# Patient Record
Sex: FEMALE | Race: WHITE | NOT HISPANIC OR LATINO | URBAN - METROPOLITAN AREA
[De-identification: names, ages, dates, MRNs, and addresses within clinical notes are randomized per-mention and may not be internally consistent; named-entity substitution may affect disease eponyms.]

---

## 2020-08-14 ENCOUNTER — EMERGENCY (EMERGENCY)
Facility: HOSPITAL | Age: 80
LOS: 1 days | Discharge: HOME | End: 2020-08-14
Attending: EMERGENCY MEDICINE | Admitting: EMERGENCY MEDICINE
Payer: MEDICARE

## 2020-08-14 VITALS
RESPIRATION RATE: 18 BRPM | HEIGHT: 65 IN | OXYGEN SATURATION: 98 % | HEART RATE: 100 BPM | TEMPERATURE: 98 F | WEIGHT: 138.01 LBS | DIASTOLIC BLOOD PRESSURE: 81 MMHG | SYSTOLIC BLOOD PRESSURE: 188 MMHG

## 2020-08-14 VITALS
SYSTOLIC BLOOD PRESSURE: 160 MMHG | HEART RATE: 90 BPM | OXYGEN SATURATION: 98 % | DIASTOLIC BLOOD PRESSURE: 92 MMHG | RESPIRATION RATE: 17 BRPM
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
## 2020-08-14 DIAGNOSIS — S09.90XA UNSPECIFIED INJURY OF HEAD, INITIAL ENCOUNTER: ICD-10-CM

## 2020-08-14 DIAGNOSIS — S01.81XA LACERATION WITHOUT FOREIGN BODY OF OTHER PART OF HEAD, INITIAL ENCOUNTER: ICD-10-CM

## 2020-08-14 DIAGNOSIS — R55 SYNCOPE AND COLLAPSE: ICD-10-CM

## 2020-08-14 DIAGNOSIS — Z98.49 CATARACT EXTRACTION STATUS, UNSPECIFIED EYE: Chronic | ICD-10-CM
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
## 2020-08-14 DIAGNOSIS — S70.311A ABRASION, RIGHT THIGH, INITIAL ENCOUNTER: ICD-10-CM
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
## 2020-08-14 LAB
ANION GAP SERPL CALC-SCNC: 15 MMOL/L — SIGNIFICANT CHANGE UP (ref 5–17)
BASOPHILS # BLD AUTO: 0.03 K/UL — SIGNIFICANT CHANGE UP (ref 0–0.2)
BASOPHILS NFR BLD AUTO: 0.3 % — SIGNIFICANT CHANGE UP (ref 0–2)
BUN SERPL-MCNC: 17 MG/DL — SIGNIFICANT CHANGE UP (ref 7–23)
CALCIUM SERPL-MCNC: 10 MG/DL — SIGNIFICANT CHANGE UP (ref 8.4–10.5)
CHLORIDE SERPL-SCNC: 95 MMOL/L — LOW (ref 96–108)
CO2 SERPL-SCNC: 26 MMOL/L — SIGNIFICANT CHANGE UP (ref 22–31)
CREAT SERPL-MCNC: 0.81 MG/DL — SIGNIFICANT CHANGE UP (ref 0.5–1.3)
EOSINOPHIL # BLD AUTO: 0.01 K/UL — SIGNIFICANT CHANGE UP (ref 0–0.5)
EOSINOPHIL NFR BLD AUTO: 0.1 % — SIGNIFICANT CHANGE UP (ref 0–6)
GLUCOSE SERPL-MCNC: 149 MG/DL — HIGH (ref 70–99)
HCT VFR BLD CALC: 40.8 % — SIGNIFICANT CHANGE UP (ref 34.5–45)
HGB BLD-MCNC: 14.2 G/DL — SIGNIFICANT CHANGE UP (ref 11.5–15.5)
IMM GRANULOCYTES NFR BLD AUTO: 0.5 % — SIGNIFICANT CHANGE UP (ref 0–1.5)
LYMPHOCYTES # BLD AUTO: 0.69 K/UL — LOW (ref 1–3.3)
LYMPHOCYTES # BLD AUTO: 6.9 % — LOW (ref 13–44)
MCHC RBC-ENTMCNC: 31 PG — SIGNIFICANT CHANGE UP (ref 27–34)
MCHC RBC-ENTMCNC: 34.8 GM/DL — SIGNIFICANT CHANGE UP (ref 32–36)
MCV RBC AUTO: 89.1 FL — SIGNIFICANT CHANGE UP (ref 80–100)
MONOCYTES # BLD AUTO: 0.42 K/UL — SIGNIFICANT CHANGE UP (ref 0–0.9)
MONOCYTES NFR BLD AUTO: 4.2 % — SIGNIFICANT CHANGE UP (ref 2–14)
NEUTROPHILS # BLD AUTO: 8.84 K/UL — HIGH (ref 1.8–7.4)
NEUTROPHILS NFR BLD AUTO: 88 % — HIGH (ref 43–77)
NRBC # BLD: 0 /100 WBCS — SIGNIFICANT CHANGE UP (ref 0–0)
PLATELET # BLD AUTO: 173 K/UL — SIGNIFICANT CHANGE UP (ref 150–400)
POTASSIUM SERPL-MCNC: 3 MMOL/L — LOW (ref 3.5–5.3)
POTASSIUM SERPL-SCNC: 3 MMOL/L — LOW (ref 3.5–5.3)
RBC # BLD: 4.58 M/UL — SIGNIFICANT CHANGE UP (ref 3.8–5.2)
RBC # FLD: 13.1 % — SIGNIFICANT CHANGE UP (ref 10.3–14.5)
SODIUM SERPL-SCNC: 136 MMOL/L — SIGNIFICANT CHANGE UP (ref 135–145)
TROPONIN T SERPL-MCNC: <0.01 NG/ML — SIGNIFICANT CHANGE UP (ref 0–0.01)
WBC # BLD: 10.04 K/UL — SIGNIFICANT CHANGE UP (ref 3.8–10.5)
WBC # FLD AUTO: 10.04 K/UL — SIGNIFICANT CHANGE UP (ref 3.8–10.5)

## 2020-08-14 PROCEDURE — 70450 CT HEAD/BRAIN W/O DYE: CPT | Mod: 26

## 2020-08-14 PROCEDURE — 85025 COMPLETE CBC W/AUTO DIFF WBC: CPT

## 2020-08-14 PROCEDURE — 96360 HYDRATION IV INFUSION INIT: CPT | Mod: XU

## 2020-08-14 PROCEDURE — 12031 INTMD RPR S/A/T/EXT 2.5 CM/<: CPT

## 2020-08-14 PROCEDURE — 93010 ELECTROCARDIOGRAM REPORT: CPT

## 2020-08-14 PROCEDURE — 96361 HYDRATE IV INFUSION ADD-ON: CPT | Mod: XU

## 2020-08-14 PROCEDURE — 70450 CT HEAD/BRAIN W/O DYE: CPT

## 2020-08-14 PROCEDURE — 82962 GLUCOSE BLOOD TEST: CPT

## 2020-08-14 PROCEDURE — 84484 ASSAY OF TROPONIN QUANT: CPT

## 2020-08-14 PROCEDURE — 99285 EMERGENCY DEPT VISIT HI MDM: CPT

## 2020-08-14 PROCEDURE — 99285 EMERGENCY DEPT VISIT HI MDM: CPT | Mod: 25

## 2020-08-14 PROCEDURE — 80048 BASIC METABOLIC PNL TOTAL CA: CPT

## 2020-08-14 PROCEDURE — 93005 ELECTROCARDIOGRAM TRACING: CPT | Mod: XU

## 2020-08-14 PROCEDURE — 36415 COLL VENOUS BLD VENIPUNCTURE: CPT

## 2020-08-14 RX ORDER — POTASSIUM CHLORIDE 20 MEQ
60 PACKET (EA) ORAL ONCE
Refills: 0 | Status: COMPLETED | OUTPATIENT
Start: 2020-08-14 | End: 2020-08-14

## 2020-08-14 RX ORDER — SODIUM CHLORIDE 9 MG/ML
1000 INJECTION INTRAMUSCULAR; INTRAVENOUS; SUBCUTANEOUS ONCE
Refills: 0 | Status: COMPLETED | OUTPATIENT
Start: 2020-08-14 | End: 2020-08-14

## 2020-08-14 RX ORDER — ROSUVASTATIN CALCIUM 5 MG/1
0 TABLET ORAL
Qty: 0 | Refills: 0 | DISCHARGE

## 2020-08-14 RX ADMIN — Medication 60 MILLIEQUIVALENT(S): at 20:00

## 2020-08-14 RX ADMIN — SODIUM CHLORIDE 1000 MILLILITER(S): 9 INJECTION INTRAMUSCULAR; INTRAVENOUS; SUBCUTANEOUS at 20:00

## 2020-08-14 RX ADMIN — SODIUM CHLORIDE 1000 MILLILITER(S): 9 INJECTION INTRAMUSCULAR; INTRAVENOUS; SUBCUTANEOUS at 18:20

## 2020-08-14 NOTE — ED PROVIDER NOTE - NSFOLLOWUPINSTRUCTIONS_ED_ALL_ED_FT
Syncope  Head injury  Forehead laceration    Keep wound clean and dry.  Use antibiotic ointment on wound 3 times a day.  Return for increased pain, redness/swelling/drainage from wound, fever, any other concerns.  Use tylenol as needed for pain.      Return for increased pain, difficulty breathing, palpitations, any other concerns.     Syncope    Syncope is when you temporarily lose consciousness, also called fainting or passing out. It is caused by a sudden decrease in blood flow to the brain. Even though most causes of syncope are not dangerous, syncope can possibly be a sign of a serious medical problem. Signs that you may be about to faint include feeling dizzy, lightheaded, nausea, visual changes, or cold/clammy skin. Do not drive, operate heavy machinery, or play sports until your health care provider says it is okay.    SEEK IMMEDIATE MEDICAL CARE IF YOU HAVE ANY OF THE FOLLOWING SYMPTOMS: severe headache, pain in your chest/abdomen/back, bleeding from your mouth or rectum, palpitations, shortness of breath, pain with breathing, seizure, confusion, or trouble walking.    Closed Head Injury    A closed head injury is an injury to your head that may or may not involve a traumatic brain injury (TBI). Symptoms of TBI can be short or long lasting and include headache, dizziness, interference with memory or speech, fatigue, confusion, changes in sleep, mood changes, nausea, depression/anxiety, and dulling of senses. Make sure to obtain proper rest which includes getting plenty of sleep, avoiding excessive visual stimulation, and avoiding activities that may cause physical or mental stress. Avoid any situation where there is potential for another head injury, including sports.    SEEK IMMEDIATE MEDICAL CARE IF YOU HAVE ANY OF THE FOLLOWING SYMPTOMS: unusual drowsiness, vomiting, severe dizziness, seizures, lightheadedness, muscular weakness, different pupil sizes, visual changes, or clear or bloody discharge from your ears or nose.     Laceration    A laceration is a cut that goes through all of the layers of the skin and into the tissue that is right under the skin. Some lacerations heal on their own. Others need to be closed with skin adhesive strips, skin glue, stitches (sutures), or staples. Proper laceration care minimizes the risk of infection and helps the laceration to heal better.  If non-absorbable stitches or staples have been placed, they must be taken out within the time frame instructed by your healthcare provider.    SEEK IMMEDIATE MEDICAL CARE IF YOU HAVE ANY OF THE FOLLOWING SYMPTOMS: swelling around the wound, worsening pain, drainage from the wound, red streaking going away from your wound, inability to move finger or toe near the laceration, or discoloration of skin near the laceration.

## 2020-08-14 NOTE — ED PROVIDER NOTE - CARE PLAN
Principal Discharge DX:	Syncope  Secondary Diagnosis:	CHI (closed head injury), initial encounter  Secondary Diagnosis:	Forehead laceration, initial encounter

## 2020-08-14 NOTE — ED ADULT NURSE NOTE - CAS TRG GENERAL AIRWAY, MLM
· Subjective and Objective:   Pt is a 67 yo female hx of anxiety, depression , hypothyroidism , previous UTI, hx of meningioma 9/24/19. Admitted 11/24/19 with report of sheets damp and evaluation suspicious of CSF leak.  Allergy - PCN- hives    PAST MEDICAL & SURGICAL HISTORY:  HLD (hyperlipidemia)  Thyroid nodule: 1999  OA (osteoarthritis)  Depression with anxiety  S/P resection of meningioma: 2003  S/P thyroidectomy: 1999 for nodule  S/P lumbar spinal fusion: 2003    Allergies    penicillin (Hives)    Intolerances            REVIEW OF SYSTEMS: [ ] Unable to Assess due to neurologic exam   [x] All ROS addressed below are non-contributory, except:  Neuro: [ ] Headache [ ] Back pain [ ] Numbness [ ] Weakness [ ] Ataxia [ ] Dizziness [ ] Aphasia [ ] Dysarthria [ ] Visual disturbance  Resp: [ ] Shortness of breath/dyspnea, [ ] Orthopnea [ ] Cough  CV: [ ] Chest pain [ ] Palpitation [ ] Lightheadedness [ ] Syncope  Renal: [ ] Thirst [ ] Edema  GI: [ ] Nausea [ ] Emesis [ ] Abdominal pain [ ] Constipation [ ] Diarrhea  Hem: [ ] Hematemesis [ ] bright red blood per rectum  ID: [ ] Fever [ ] Chills [ ] Dysuria  ENT: [ ] Rhinorrhea        No overnight events : no signs of CSF leak     T(C): 36.9 (11-28-19 @ 07:00), Max: 37 (11-28-19 @ 03:00)  HR: 60 (11-28-19 @ 08:00) (60 - 98)  BP: 132/79 (11-28-19 @ 08:00) (99/72 - 133/79)  RR: 14 (11-28-19 @ 08:00) (12 - 24)  SpO2: 97% (11-28-19 @ 08:00) (94% - 99%)  11-27-19 @ 07:01  -  11-28-19 @ 07:00  --------------------------------------------------------  IN: 880 mL / OUT: 1080 mL / NET: -200 mL    11-28-19 @ 07:01  -  11-28-19 @ 12:06  --------------------------------------------------------  IN: 0 mL / OUT: 260 mL / NET: -260 mL    acetaminophen   Tablet .. 650 milliGRAM(s) Oral every 6 hours PRN  ARIPiprazole 2 milliGRAM(s) Oral daily  atorvastatin 40 milliGRAM(s) Oral at bedtime  buPROPion XL . 300 milliGRAM(s) Oral daily  chlorhexidine 4% Liquid 1 Application(s) Topical <User Schedule>  clonazePAM  Tablet 0.5 milliGRAM(s) Oral every 8 hours  enoxaparin Injectable 40 milliGRAM(s) SubCutaneous <User Schedule>  folic acid 1 milliGRAM(s) Oral daily  levothyroxine 88 MICROGram(s) Oral daily  ondansetron Injectable 4 milliGRAM(s) IV Push every 6 hours PRN  polyethylene glycol 3350 17 Gram(s) Oral every 12 hours  senna 2 Tablet(s) Oral at bedtime PRN        PHYSICAL EXAM:    Constitutional: No Acute Distress     Neurological: Awake, alert oriented to person, place and time, Following Commands, PERRL, EOMI, No Gaze Preference, Face Symmetrical, Speech Fluent, No dysmetria, , No nystagmus     Motor exam:          Upper extremity                         Delt     Bicep     Tricep    HG                                                 R         5/5        5/5        5/5       5/5                                               L          5/5        5/5        5/5       5/5          Lower extremity                        HF         KF        KE       DF         PF                                                  R        5/5        5/5        5/5       5/5         5/5                                               L         5/5        5/5       5/5       5/5          5/5                                                 Sensation: [ X] intact to light touch       Pulmonary: Clear to Auscultation, No rales, No rhonchi, No wheezes     Cardiovascular: S1, S2, Regular rate and rhythm     Gastrointestinal: Soft, Non-tender, Non-distended     Extremities: No calf tenderness     Incision: slight eryhthema at surg site          LABS:  Na: 142 (11-25 @ 22:47)  K: 4.0 (11-25 @ 22:47)  Cl: 109 (11-25 @ 22:47)  CO2: 23 (11-25 @ 22:47)  BUN: 18 (11-25 @ 22:47)  Cr: 1.02 (11-25 @ 22:47)  Glu: 103(11-25 @ 22:47)    Hgb: 13.0 (11-25 @ 22:47)  Hct: 41.1 (11-25 @ 22:47)  WBC: 8.11 (11-25 @ 22:47)  Plt: 260 (11-25 @ 22:47)    INR:   PTT:       ASSESSMENT/PLAN: S/P meningioma resection 9/19 , CSF leakage, now with LD     NEURO:  neuro checks q 1hr   CSF 5 cc q 1 hr   Resume meds - home   VPS tomorrow   Activity: [] OOB as tolerated [] Bedrest [] PT [] OT [] PMNR [] Bed in Chair Position     PULM:  Incentive gianfranco  Maintain Sats > 92 %    CV:  SBP goal- 100- < 150      RENAL: IVL       GI:   Diet: regular diet   GI prophylaxis [X] not indicated  Bowel regimen [] colace [X] senna [] other:    ENDO:   Goal euglycemia (-180)    HEME/ONC:  VTE prophylaxis: [X] SCDs [] chemoprophylaxis start Lovenox   Doppler LE     ID: Afebrile     SOCIAL/FAMILY:  [X] updated at bedside     CODE STATUS:  [X] Full Code     DISPOSITION:  [X] ICU     [X] Patient is at high risk of neurologic deterioration/death due to:  CSF leakage and infection     Time spent: 30 critical care minutes Patent

## 2020-08-14 NOTE — ED PROVIDER NOTE - PROGRESS NOTE DETAILS
Pt request plastics; plastics consulted and coming to eval pt. Pt w orthostatic drop in bp w/o sx.  Will give ivf and reassess.  Labs nl, lac repaired by plastics.  Await ct. Orthostatics improved.  CT prelim neg.  Will dc.  Pt's pmd paged to update. Dr Zepeda, pmd coverage, updated.

## 2020-08-14 NOTE — ED ADULT NURSE REASSESSMENT NOTE - NS ED NURSE REASSESS COMMENT FT1
pt is resting in the bed without distress. laceration repair is done, ice pack is applied on R forehead. neuro intact, denies dizziness, n/v, fever/chills/cough. awaiting for CT scan, will continue monitoring.

## 2020-08-14 NOTE — ED ADULT TRIAGE NOTE - ARRIVAL INFO ADDITIONAL COMMENTS
pt felt sweaty and dizzy after a BM at 1400.  Attempted to walk to bed and had syncopal episode.  pt fell and hit head on a plastic box. + laceration right forehead and abrasions right thigh.  Speech clear, face symmetrical, 5/5 strength all extremities.  no dizziness or visual problems now. pt felt sweaty and dizzy after a BM at 1400.  Attempted to walk to bed and had syncopal episode.  pt fell and hit head on a plastic box. + laceration right forehead and abrasions right thigh.  Speech clear, face symmetrical, 5/5 strength all extremities.  no dizziness or visual problems now. denies head or neck pain.

## 2020-08-14 NOTE — ED ADULT NURSE NOTE - OBJECTIVE STATEMENT
81 y/o female presents to ED s/p syncope + fall after having a BM at approx 2pm. Denies chest pain/sob/dizziness. Reports after getting up off the toilet she lost consciousness and fell, hitting her head on a box. With laceration to R side forehead, no active bleeding. PERRL, no unilateral weakness or sensory loss. No spinal tenderness. Ambulatory in ED.

## 2020-08-14 NOTE — ED PROVIDER NOTE - PATIENT PORTAL LINK FT
You can access the FollowMyHealth Patient Portal offered by [facility] by registering at the following website: [email or web address]. By joining [facility]’s FollowMyHealth portal, you will also be able to view your health information using other applications (apps) compatible with our system.

## 2020-08-14 NOTE — ED PROVIDER NOTE - ENMT, MLM
Airway patent, Nasal mucosa clear. Mouth with normal mucosa. Throat has no vesicles, no oropharyngeal exudates and uvula is midline. 2 cm c shaped lac R forehead, abrasions R scalp top of head, no facial bone ttp

## 2020-08-14 NOTE — ED PROVIDER NOTE - CARE PROVIDER_API CALL
Miguel Ewing  PLASTIC SURGERY  [address] [place] [id number]  Phone: [phone]  Fax: [phone]  Follow Up Time:

## 2020-08-14 NOTE — ED PROVIDER NOTE - CLINICAL SUMMARY MEDICAL DECISION MAKING FREE TEXT BOX
Pt c/o syncope - hpi suggests defecation syncope (vasovagal); no sig concern for cardiac or neuro event.  Sx occurred ~ 4 h pta - 1 set of enzymes sufficient to r/o low suspicion acs.  Plan labs, ekg, orthostatics, plastics per pt's request, ct head.  Reassess.

## 2020-08-14 NOTE — ED PROVIDER NOTE - MUSCULOSKELETAL, MLM
Spine appears normal, neck/back nontender, range of motion is not limited, no muscle or joint tenderness, abrasions R thigh

## 2020-08-14 NOTE — ED ADULT NURSE NOTE - TEMPLATE LIST FOR HEAD TO TOE ASSESSMENT
Per Patient's request,  completed and faxed [facility] request for lodging dates 12/1-12/3. [place] will contact Patient for confirmation of reservation.  will continue to provide support as needed.    Soo Yeon Han, [facility] CenterSW  Pager:  [phone]     Fall

## 2020-08-14 NOTE — ED PROVIDER NOTE - OBJECTIVE STATEMENT
81 yo female h/o htn, hld, syncope (food related if pt eats cream and drinks alcohol) c/o syncope after defecating this afternoon at 2.  Pt felt dizzy as if she might pass out, diaphoretic and tried to walk to her bed but passed out and fell before reaching the bed.  Pt notes pain at forehead where she has a lac and abrasions on scalp as well as thigh.  Pt denies current or preceding ha, change in vision/speech/gait, numbness or weakness in ext, cp, palpitations sob, neck/back pain, pain in ext, abd pain, n/v/d.  Pt denies h/o vasovagal syncope in the past. Pt states td < 10 yr.  Pt requesting plastics repair.  Pt reports h/o high bp when her bp is taken 2/2 phobia for bp being taken.

## 2020-12-17 NOTE — ED ADULT TRIAGE NOTE - WEIGHT METHOD
stated Spiral Flap Text: The defect edges were debeveled with a #15 scalpel blade.  Given the location of the defect, shape of the defect and the proximity to free margins a spiral flap was deemed most appropriate.  Using a sterile surgical marker, an appropriate rotation flap was drawn incorporating the defect and placing the expected incisions within the relaxed skin tension lines where possible. The area thus outlined was incised deep to adipose tissue with a #15 scalpel blade.  The skin margins were undermined to an appropriate distance in all directions utilizing iris scissors.